# Patient Record
Sex: FEMALE | Employment: OTHER
[De-identification: names, ages, dates, MRNs, and addresses within clinical notes are randomized per-mention and may not be internally consistent; named-entity substitution may affect disease eponyms.]

---

## 2024-03-18 ENCOUNTER — TRANSCRIBE ORDERS (OUTPATIENT)
Dept: OTHER | Age: 64
End: 2024-03-18

## 2024-03-18 ENCOUNTER — TELEPHONE (OUTPATIENT)
Dept: OPHTHALMOLOGY | Facility: CLINIC | Age: 64
End: 2024-03-18
Payer: MEDICAID

## 2024-03-18 DIAGNOSIS — H02.401 PTOSIS OF EYELID, RIGHT: Primary | ICD-10-CM

## 2024-03-18 DIAGNOSIS — I67.1 GIANT INTRACRANIAL ANEURYSM: ICD-10-CM

## 2024-03-18 NOTE — TELEPHONE ENCOUNTER
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 DIAAGNOSIS NOT LISTED ON PROTOCOL , PLEASE REVIEW AND CONTACT Call Kathi at [facility] ot schedule consult - [phone] , THANK YOU     Action Taken: Message routed to:  [facility] ([facility]): EYE    Travel Screening: Not Applicable

## 2024-03-19 NOTE — TELEPHONE ENCOUNTER
Ok for first available with Dr. Foreman/Dr. Melo.    Will forward to patient communicator to assist in reaching back out to review/confirm scheduling options.    Nahid Norton RN 5:18 PM 03/19/24

## 2024-03-19 NOTE — TELEPHONE ENCOUNTER
Spoke to  and states patient not available. Was asked to leave voicemessage with triage/clinic number    Called back and mailbox full.    Nahid Norton RN 9:59 AM 03/19/24

## 2024-03-20 NOTE — TELEPHONE ENCOUNTER
Nahid called and I called x2 - VM is full  unable to leave a VM =no other number on file to call     Ok to schedule with Dr. Foreman or Dr. Vega in a new neuro time slot for next available -    [facility] not active - not able to send a message     Sarika Russell Communication Facilitator on 3/20/2024 at 10:28 AM

## 2024-03-21 NOTE — TELEPHONE ENCOUNTER
I called again  - VM is full  unable to leave a VM =no other number on file to call      Ok to schedule with Dr. Foreman or Dr. Vega in a new neuro time slot for next available -     [facility] not active - not able to send a message     Sarika Russell Communication Facilitator on 3/21/2024 at 9:48 AM